# Patient Record
Sex: MALE | Race: WHITE | HISPANIC OR LATINO | URBAN - METROPOLITAN AREA
[De-identification: names, ages, dates, MRNs, and addresses within clinical notes are randomized per-mention and may not be internally consistent; named-entity substitution may affect disease eponyms.]

---

## 2022-12-11 ENCOUNTER — HOSPITAL ENCOUNTER (EMERGENCY)
Facility: HOSPITAL | Age: 37
Discharge: ANOTHER HOSPITAL | End: 2022-12-11
Attending: EMERGENCY MEDICINE | Admitting: EMERGENCY MEDICINE

## 2022-12-11 ENCOUNTER — APPOINTMENT (OUTPATIENT)
Dept: GENERAL RADIOLOGY | Facility: HOSPITAL | Age: 37
End: 2022-12-11

## 2022-12-11 VITALS
HEART RATE: 52 BPM | WEIGHT: 245.4 LBS | BODY MASS INDEX: 34.35 KG/M2 | HEIGHT: 71 IN | DIASTOLIC BLOOD PRESSURE: 83 MMHG | OXYGEN SATURATION: 97 % | RESPIRATION RATE: 16 BRPM | SYSTOLIC BLOOD PRESSURE: 133 MMHG | TEMPERATURE: 98.5 F

## 2022-12-11 DIAGNOSIS — S61.022A LACERATION OF LEFT THUMB WITH FOREIGN BODY WITHOUT DAMAGE TO NAIL, INITIAL ENCOUNTER: Primary | ICD-10-CM

## 2022-12-11 PROCEDURE — 25010000002 TETANUS-DIPHTH-ACELL PERTUSSIS 5-2.5-18.5 LF-MCG/0.5 SUSPENSION PREFILLED SYRINGE: Performed by: EMERGENCY MEDICINE

## 2022-12-11 PROCEDURE — 99282 EMERGENCY DEPT VISIT SF MDM: CPT

## 2022-12-11 PROCEDURE — 90471 IMMUNIZATION ADMIN: CPT | Performed by: EMERGENCY MEDICINE

## 2022-12-11 PROCEDURE — 90715 TDAP VACCINE 7 YRS/> IM: CPT | Performed by: EMERGENCY MEDICINE

## 2022-12-11 PROCEDURE — 73130 X-RAY EXAM OF HAND: CPT

## 2022-12-11 PROCEDURE — 99283 EMERGENCY DEPT VISIT LOW MDM: CPT

## 2022-12-11 RX ADMIN — TETANUS TOXOID, REDUCED DIPHTHERIA TOXOID AND ACELLULAR PERTUSSIS VACCINE, ADSORBED 0.5 ML: 5; 2.5; 8; 8; 2.5 SUSPENSION INTRAMUSCULAR at 12:29

## 2022-12-11 NOTE — ED PROVIDER NOTES
"Subjective     History provided by:  Patient   used: Yes      History of Present Illness    · Chief complaint: Infected thumb.    · Location: Base of the left thumb.    · Quality/Severity: Patient is swelling and tenderness and pain at the base of the left thumb with redness.    · Timing/Onset: He got a splinter impaled in his left thumb 2 weeks ago.    · Modifying Factors: He was started on Augmentin 5 days ago without improvement.    · Associated symptoms: Denies a fever.    · Narrative: The patient is a 37-year-old  male who got a splinter from a board impaled and the base of his left thumb.  He had redness and swelling subsequently and was not able to get the splinter out.  He was seen at urgent care last Tuesday (5 days ago) who started him on Augmentin.  The redness and swelling has gotten worse.    Review of Systems   Constitutional: Negative for chills and fever.   HENT: Negative for congestion, ear discharge, rhinorrhea and sore throat.    Eyes: Negative for pain and visual disturbance.   Respiratory: Negative for cough and shortness of breath.    Cardiovascular: Negative for chest pain.   Gastrointestinal: Negative for abdominal pain, diarrhea, nausea and vomiting.   Musculoskeletal: Negative for back pain, neck pain and neck stiffness.   Skin:        Redness and swelling at the base of the left thumb.   Neurological: Negative for dizziness, weakness, light-headedness and numbness.     History reviewed. No pertinent past medical history.  /83 (BP Location: Right arm, Patient Position: Lying)   Pulse 52   Temp 98.5 °F (36.9 °C) (Oral)   Resp 16   Ht 180.3 cm (71\")   Wt 111 kg (245 lb 6.4 oz)   SpO2 97%   BMI 34.23 kg/m²     History reviewed. No pertinent past medical history.  Labs Reviewed - No data to display    No Known Allergies    History reviewed. No pertinent surgical history.    History reviewed. No pertinent family history.    Social History "     Socioeconomic History   • Marital status:    Tobacco Use   • Smoking status: Never   • Smokeless tobacco: Never   Vaping Use   • Vaping Use: Never used   Substance and Sexual Activity   • Alcohol use: Not Currently   • Drug use: Defer   • Sexual activity: Defer           Objective   Physical Exam  Vitals and nursing note reviewed.   Constitutional:       General: He is not in acute distress.     Appearance: Normal appearance. He is normal weight. He is not toxic-appearing or diaphoretic.      Comments: The patient appears healthy in no acute distress.  Review of his vital signs: He is afebrile with a temperature of 98.5, blood pressure elevated 162/98, remainder vital signs within normal limits.   HENT:      Head: Normocephalic and atraumatic.   Eyes:      General: No scleral icterus.  Musculoskeletal:      Comments: The patient's erythema and slough tissue swelling and tenderness at the base of the left thumb.  There is no visible puncture wound.  No visible foreign body.  The erythema swelling and tenderness does not extend into the proximal hand.  He has range of motion at the IP and MCP joint with discomfort.  The distal thumb is neurovascular intact.   Skin:     Capillary Refill: Capillary refill takes less than 2 seconds.      Comments: The patient has erythema and swelling with soft tissue tenderness of the base of the left thumb on the volar aspect.   Neurological:      General: No focal deficit present.      Mental Status: He is alert and oriented to person, place, and time.      Cranial Nerves: No cranial nerve deficit.      Motor: No weakness.   Psychiatric:         Mood and Affect: Mood normal.         Behavior: Behavior normal.         Thought Content: Thought content normal.         Judgment: Judgment normal.         Procedures           ED Course  ED Course as of 12/11/22 1616   Sun Dec 11, 2022   1211 X-ray of the left hand shows soft tissue swelling at the base of the thumb but no visible  foreign body. [TP]   1211 Wood foreign bodies do not show up on x-rays.  Is my impression the patient has a retained splinter foreign body that is causing his thumb to be secondarily infected.  He does not appear toxic.  The foreign body will need to be removed for the patient to heal. [TP]   1211 12:01 patient discussed with Tammy, Kleinert and Shabnam hand surgeons, who requested the patient be sent down to the [facility] ER with Dr. Skinner accepting.  The patient will be sent down with a disc of his x-rays.  I discussed this with the patient and his friend who is interpreting for him. [TP]      ED Course User Index  [TP] Aaron Carlson MD                                           MDM  Number of Diagnoses or Management Options  Laceration of left thumb with foreign body without damage to nail, initial encounter: established and worsening     Amount and/or Complexity of Data Reviewed  Tests in the radiology section of CPT®: ordered and reviewed  Discuss the patient with other providers: yes    Risk of Complications, Morbidity, and/or Mortality  Presenting problems: moderate  Diagnostic procedures: moderate  Management options: moderate    Patient Progress  Patient progress: stable      Final diagnoses:   Laceration of left thumb with foreign body without damage to nail, initial encounter       ED Disposition  ED Disposition     ED Disposition   Transfer to Another Facility     Condition   --    Comment   [facility] ER.  Dr. Skinner accepting.             No follow-up provider specified.       Medication List      No changes were made to your prescriptions during this visit.         Labs Reviewed - No data to display  XR Hand 3+ View Left   Final Result   Negative left hand attention thumb. Subjectively there may be mild soft tissue swelling about the thumb but no soft tissue gas or foreign body.      Signer Name: DAYA Rose MD    Signed: 12/11/2022 11:23 AM    Workstation Name:  [facility]     [facility]             Medication List      No changes were made to your prescriptions during this visit.              Aaron Carlson MD  12/11/22 [id number]